# Patient Record
Sex: MALE | Race: BLACK OR AFRICAN AMERICAN | NOT HISPANIC OR LATINO | URBAN - METROPOLITAN AREA
[De-identification: names, ages, dates, MRNs, and addresses within clinical notes are randomized per-mention and may not be internally consistent; named-entity substitution may affect disease eponyms.]

---

## 2018-04-12 ENCOUNTER — EMERGENCY (EMERGENCY)
Facility: HOSPITAL | Age: 28
LOS: 1 days | Discharge: HOME | End: 2018-04-12
Attending: EMERGENCY MEDICINE | Admitting: EMERGENCY MEDICINE
Payer: OTHER MISCELLANEOUS

## 2018-04-12 VITALS
DIASTOLIC BLOOD PRESSURE: 79 MMHG | RESPIRATION RATE: 17 BRPM | WEIGHT: 169.98 LBS | HEART RATE: 80 BPM | TEMPERATURE: 98 F | SYSTOLIC BLOOD PRESSURE: 129 MMHG | OXYGEN SATURATION: 99 %

## 2018-04-12 PROCEDURE — 73610 X-RAY EXAM OF ANKLE: CPT | Mod: 26,RT

## 2018-04-12 PROCEDURE — 99283 EMERGENCY DEPT VISIT LOW MDM: CPT | Mod: 25

## 2018-04-12 PROCEDURE — 73630 X-RAY EXAM OF FOOT: CPT | Mod: 26,RT

## 2018-04-12 PROCEDURE — 29515 APPLICATION SHORT LEG SPLINT: CPT | Mod: RT

## 2018-04-12 RX ORDER — ACETAMINOPHEN 500 MG
650 TABLET ORAL ONCE
Qty: 0 | Refills: 0 | Status: COMPLETED | OUTPATIENT
Start: 2018-04-12 | End: 2018-04-12

## 2018-04-12 RX ORDER — IBUPROFEN 200 MG
600 TABLET ORAL ONCE
Qty: 0 | Refills: 0 | Status: COMPLETED | OUTPATIENT
Start: 2018-04-12 | End: 2018-04-12

## 2018-04-12 RX ADMIN — Medication 650 MILLIGRAM(S): at 15:16

## 2018-04-12 RX ADMIN — Medication 600 MILLIGRAM(S): at 15:17

## 2018-04-12 NOTE — ED PROVIDER NOTE - OBJECTIVE STATEMENT
29 y/o M with PMH of depression p/w right ankle. Pt rolled his ankle while running at work, fell forward, only c/o foot/ankle pain now, able to ambulate without assistance. Pt denies numbness, weakness, head trauma, LOC, other injuries.

## 2018-04-12 NOTE — ED ADULT NURSE NOTE - CHPI ED SYMPTOMS NEG
no abrasion/no bruising/no tingling/no stiffness/no numbness/no deformity/no back pain/no difficulty bearing weight/no weakness/no fever

## 2018-04-12 NOTE — ED PROVIDER NOTE - PROGRESS NOTE DETAILS
Xrays reviewed and wnl. Will dc with motrin/tylenol, rest for two days, cane. Patient is stable, vital signs stable. Patient able to verbalize understanding of discharge instructions, return instructions, and need for close follow up.  Nirmal Verdin MD PGY-4 Received phone call from radiology regarding possible avulsive fracture. Given that result, pt called back in to have air-cast placed and crutches. Pt came back in. Will give ortho f/u. Pt able to verbalize understanding of discharge/return/follow up instructions.  Nirmal Verdin MD PGY-4

## 2018-04-12 NOTE — ED PROVIDER NOTE - MEDICAL DECISION MAKING DETAILS
ankle/foot pain s/p fall, likely ligamentous injury, neurovascularly intact with no other injuries, r/o fx/dislocation, pain control, xrays, rest/ice/elevate/ace wrap, weight bearing as tolerated, reassess

## 2018-04-12 NOTE — ED ADULT NURSE NOTE - OBJECTIVE STATEMENT
right ankle pain x 1 da after injuring it at work. Ambulates with steady gait, in NAD, resting comfortably, and will continue to monitor.
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
## 2018-04-16 DIAGNOSIS — M25.571 PAIN IN RIGHT ANKLE AND JOINTS OF RIGHT FOOT: ICD-10-CM
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
